# Patient Record
Sex: FEMALE | Race: WHITE
[De-identification: names, ages, dates, MRNs, and addresses within clinical notes are randomized per-mention and may not be internally consistent; named-entity substitution may affect disease eponyms.]

---

## 2017-03-29 ENCOUNTER — HOSPITAL ENCOUNTER (EMERGENCY)
Dept: HOSPITAL 74 - JER | Age: 52
Discharge: HOME | End: 2017-03-29
Payer: COMMERCIAL

## 2017-03-29 VITALS — HEART RATE: 58 BPM | DIASTOLIC BLOOD PRESSURE: 72 MMHG | SYSTOLIC BLOOD PRESSURE: 110 MMHG

## 2017-03-29 VITALS — TEMPERATURE: 98 F

## 2017-03-29 VITALS — BODY MASS INDEX: 24.9 KG/M2

## 2017-03-29 DIAGNOSIS — I10: ICD-10-CM

## 2017-03-29 DIAGNOSIS — M79.661: Primary | ICD-10-CM

## 2017-03-29 NOTE — PDOC
[id number]


No Limitations





- History of Present Illness


Initial Comments: 


03/29/17 18:26


The patient is a 51-year-old woman with a significant past medical history of 

hypertension who was sent to the emergency department by her PMD to rule out 

possible tight lower extremity DVT. As per patient, she reports that a sharp 

right leg pain woke her up from her sleep this morning. She took an Aspirin 

that provided minimal relief. She went on to work and maintained her leg 

elevated. While at work, she noticed that her veins were highly noticeable. She 

informed her PMD, who advised her to present to the ED. No recent 

immobilizations. She currently states that she feels as if her right thigh is 

sore. No other complaints. 





No fever, chills, chest pain, lightheadedness, leg swelling, shortness of 

breath. No nausea, vomiting, diarrhea.





Allergies: Penicillins


Social History: No tobacco and recreational drug use. Social ETOH use.





<Bre Guerrero - Last Filed: 03/29/17 18:43>





<Sarah Harris - Last Filed: 03/29/17 23:14>





- General


Chief Complaint: Pain


Stated Complaint: PCP SENT/RT LEG PAIN


Time Seen by Provider: 03/29/17 18:03





Past History





<Bre Guerrero - Last Filed: 03/29/17 18:43>





- Past Medical History


Other medical history: NONE





- Psycho/Social/Smoking Cessation Hx


Suicidal Ideation: No


Smoking History: Never smoked


Hx Alcohol Use: Yes (SOCIAL)


Drug/Substance Use Hx: No


Substance Use Type: None





<Sarah Harris - Last Filed: 03/29/17 23:14>





- Past Medical History


Allergies/Adverse Reactions: 


 Allergies











Allergy/AdvReac Type Severity Reaction Status Date / Time


 


Penicillins Allergy   Unverified 03/29/17 16:59














**Review of Systems





- Review of Systems


Able to Perform ROS?: Yes


Comments:: 


03/29/17 18:26


GENERAL/CONSTITUTIONAL: No fever or chills. No weakness.


HEAD, EYES, EARS, NOSE AND THROAT: No change in vision. No ear pain or 

discharge. No sore throat.


CARDIOVASCULAR: No chest pain or shortness of breath.


RESPIRATORY: No cough, wheezing, or hemoptysis.


GASTROINTESTINAL: No nausea, vomiting, diarrhea or constipation.


GENITOURINARY: No dysuria, frequency, or change in urination.


MUSCULOSKELETAL: Yes: Right leg pain. No neck or back pain.


SKIN: No rash


NEUROLOGIC: No headache, vertigo, loss of consciousness, or change in strength/

sensation.


ENDOCRINE: No increased thirst. No abnormal weight change.


HEMATOLOGIC/LYMPHATIC: No anemia, easy bleeding, or history of blood clots.


ALLERGIC/IMMUNOLOGIC: No hives or skin allergy.








<CesarBre - Last Filed: 03/29/17 18:43>





*Physical Exam





- Vital Signs


 Last Vital Signs











Temp Pulse Resp BP Pulse Ox


 


 98.0 F   53 L  20   108/66   97 


 


 03/29/17 16:56  03/29/17 16:56  03/29/17 16:56  03/29/17 16:56  03/29/17 16:56














- Physical Exam


Comments: 


03/29/17 18:26


GENERAL: Awake, alert, and fully oriented, in no acute distress 


HEAD: No signs of trauma


EYES: PERRLA, EOMI, sclera anicteric, conjunctiva clear


ENT: Auricles normal inspection, hearing grossly normal, nares patent, 

oropharynx clear without exudates. Moist mucosa


NECK: Normal ROM, supple, no lymphadenopathy, JVD, or masses


LUNGS: Breath sounds equal, clear to auscultation bilaterally.  No wheezes, and 

no crackles


HEART: Regular rate and rhythm, normal S1 and S2, no murmurs, rubs or gallops


ABDOMEN: Soft, nontender, normoactive bowel sounds.  No guarding, no rebound.  

No masses


EXTREMITIES: Normal range of motion, no edema. The right calf is slightly 

larger than the left calf and is nontender. No clubbing or cyanosis. No cords, 

erythema, or tenderness


NEUROLOGICAL: Cranial nerves II through XII grossly intact.  Normal speech








<GuerreroBre - Last Filed: 03/29/17 18:43>





- Vital Signs


 Last Vital Signs











Temp Pulse Resp BP Pulse Ox


 


 98.0 F   53 L  20   108/66   97 


 


 03/29/17 16:56  03/29/17 16:56  03/29/17 16:56  03/29/17 16:56  03/29/17 16:56














<Sarah Harris - Last Filed: 03/29/17 23:14>





Medical Decision Making





- Medical Decision Making





03/29/17 19:03


Patient discussed with Dr. Irwin at shift change. Awaiting doppler sono to 

r/o DVT. If neg, patient is stable for DC home. 








<Sarah Harris - Last Filed: 03/29/17 23:14>





*DC/Admit/Observation/Transfer





- Attestations


Scribe Attestion: 


03/29/17 18:26


Documentation prepared by Bre Guerrero, acting as medical scribe for 

Sarah Harris MD.





<Bre Guerrero - Last Filed: 03/29/17 18:43>





<Sarah Harris - Last Filed: 03/29/17 23:14>


Diagnosis at time of Disposition: 


 Right calf pain





- Referrals


Referrals: 


Chuck Gallardo MD [Primary Care Provider] - 





- Patient Instructions


Printed Discharge Instructions:  DI for Leg Pain


Additional Instructions: 


Please follow up with your doctor as soon as possible

## 2017-03-29 NOTE — PDOC
*Physical Exam





- Vital Signs


 Last Vital Signs











Temp Pulse Resp BP Pulse Ox


 


 98.0 F   53 L  20   108/66   97 


 


 03/29/17 16:56  03/29/17 16:56  03/29/17 16:56  03/29/17 16:56  03/29/17 16:56














*DC/Admit/Observation/Transfer


Diagnosis at time of Disposition: 


 Right calf pain





- Discharge Dispostion


Admit: No





- Referrals


Referrals: 


Chuck Gallardo MD [Primary Care Provider] - 





- Patient Instructions


Printed Discharge Instructions:  DI for Leg Pain


Additional Instructions: 


Please follow up with your doctor as soon as possible





- Post Discharge Activity

## 2020-09-03 ENCOUNTER — EMERGENCY (EMERGENCY)
Facility: HOSPITAL | Age: 55
LOS: 1 days | Discharge: HOME | End: 2020-09-03
Attending: EMERGENCY MEDICINE | Admitting: EMERGENCY MEDICINE
Payer: COMMERCIAL

## 2020-09-03 VITALS
HEIGHT: 64 IN | RESPIRATION RATE: 18 BRPM | HEART RATE: 76 BPM | SYSTOLIC BLOOD PRESSURE: 115 MMHG | WEIGHT: 139.99 LBS | OXYGEN SATURATION: 96 % | DIASTOLIC BLOOD PRESSURE: 75 MMHG | TEMPERATURE: 98 F

## 2020-09-03 VITALS
HEART RATE: 58 BPM | TEMPERATURE: 98 F | RESPIRATION RATE: 18 BRPM | SYSTOLIC BLOOD PRESSURE: 108 MMHG | OXYGEN SATURATION: 97 % | DIASTOLIC BLOOD PRESSURE: 71 MMHG

## 2020-09-03 LAB
ALBUMIN SERPL ELPH-MCNC: 4.6 G/DL — SIGNIFICANT CHANGE UP (ref 3.3–5)
ALP SERPL-CCNC: 67 U/L — SIGNIFICANT CHANGE UP (ref 40–120)
ALT FLD-CCNC: 11 U/L — SIGNIFICANT CHANGE UP (ref 10–45)
ANION GAP SERPL CALC-SCNC: 11 MMOL/L — SIGNIFICANT CHANGE UP (ref 5–17)
AST SERPL-CCNC: 13 U/L — SIGNIFICANT CHANGE UP (ref 10–40)
BASOPHILS # BLD AUTO: 0.05 K/UL — SIGNIFICANT CHANGE UP (ref 0–0.2)
BASOPHILS NFR BLD AUTO: 0.7 % — SIGNIFICANT CHANGE UP (ref 0–2)
BILIRUB SERPL-MCNC: 0.8 MG/DL — SIGNIFICANT CHANGE UP (ref 0.2–1.2)
BUN SERPL-MCNC: 14 MG/DL — SIGNIFICANT CHANGE UP (ref 7–23)
CALCIUM SERPL-MCNC: 9.6 MG/DL — SIGNIFICANT CHANGE UP (ref 8.4–10.5)
CHLORIDE SERPL-SCNC: 104 MMOL/L — SIGNIFICANT CHANGE UP (ref 96–108)
CK MB CFR SERPL CALC: <1 NG/ML — SIGNIFICANT CHANGE UP (ref 0–6.7)
CK SERPL-CCNC: 48 U/L — SIGNIFICANT CHANGE UP (ref 25–170)
CO2 SERPL-SCNC: 25 MMOL/L — SIGNIFICANT CHANGE UP (ref 22–31)
CREAT SERPL-MCNC: 0.71 MG/DL — SIGNIFICANT CHANGE UP (ref 0.5–1.3)
EOSINOPHIL # BLD AUTO: 0.12 K/UL — SIGNIFICANT CHANGE UP (ref 0–0.5)
EOSINOPHIL NFR BLD AUTO: 1.7 % — SIGNIFICANT CHANGE UP (ref 0–6)
GLUCOSE SERPL-MCNC: 100 MG/DL — HIGH (ref 70–99)
HCT VFR BLD CALC: 38.8 % — SIGNIFICANT CHANGE UP (ref 34.5–45)
HGB BLD-MCNC: 12.8 G/DL — SIGNIFICANT CHANGE UP (ref 11.5–15.5)
IMM GRANULOCYTES NFR BLD AUTO: 0.3 % — SIGNIFICANT CHANGE UP (ref 0–1.5)
LIDOCAIN IGE QN: 33 U/L — SIGNIFICANT CHANGE UP (ref 7–60)
LYMPHOCYTES # BLD AUTO: 2.96 K/UL — SIGNIFICANT CHANGE UP (ref 1–3.3)
LYMPHOCYTES # BLD AUTO: 41.4 % — SIGNIFICANT CHANGE UP (ref 13–44)
MAGNESIUM SERPL-MCNC: 2 MG/DL — SIGNIFICANT CHANGE UP (ref 1.6–2.6)
MCHC RBC-ENTMCNC: 28.3 PG — SIGNIFICANT CHANGE UP (ref 27–34)
MCHC RBC-ENTMCNC: 33 GM/DL — SIGNIFICANT CHANGE UP (ref 32–36)
MCV RBC AUTO: 85.8 FL — SIGNIFICANT CHANGE UP (ref 80–100)
MONOCYTES # BLD AUTO: 0.53 K/UL — SIGNIFICANT CHANGE UP (ref 0–0.9)
MONOCYTES NFR BLD AUTO: 7.4 % — SIGNIFICANT CHANGE UP (ref 2–14)
NEUTROPHILS # BLD AUTO: 3.47 K/UL — SIGNIFICANT CHANGE UP (ref 1.8–7.4)
NEUTROPHILS NFR BLD AUTO: 48.5 % — SIGNIFICANT CHANGE UP (ref 43–77)
NRBC # BLD: 0 /100 WBCS — SIGNIFICANT CHANGE UP (ref 0–0)
NT-PROBNP SERPL-SCNC: 38 PG/ML — SIGNIFICANT CHANGE UP (ref 0–300)
PLATELET # BLD AUTO: 257 K/UL — SIGNIFICANT CHANGE UP (ref 150–400)
POTASSIUM SERPL-MCNC: 3.5 MMOL/L — SIGNIFICANT CHANGE UP (ref 3.5–5.3)
POTASSIUM SERPL-SCNC: 3.5 MMOL/L — SIGNIFICANT CHANGE UP (ref 3.5–5.3)
PROT SERPL-MCNC: 7.1 G/DL — SIGNIFICANT CHANGE UP (ref 6–8.3)
RBC # BLD: 4.52 M/UL — SIGNIFICANT CHANGE UP (ref 3.8–5.2)
RBC # FLD: 13.1 % — SIGNIFICANT CHANGE UP (ref 10.3–14.5)
SODIUM SERPL-SCNC: 140 MMOL/L — SIGNIFICANT CHANGE UP (ref 135–145)
TROPONIN T SERPL-MCNC: <0.01 NG/ML — SIGNIFICANT CHANGE UP (ref 0–0.01)
WBC # BLD: 7.15 K/UL — SIGNIFICANT CHANGE UP (ref 3.8–10.5)
WBC # FLD AUTO: 7.15 K/UL — SIGNIFICANT CHANGE UP (ref 3.8–10.5)

## 2020-09-03 PROCEDURE — 83735 ASSAY OF MAGNESIUM: CPT

## 2020-09-03 PROCEDURE — 99284 EMERGENCY DEPT VISIT MOD MDM: CPT | Mod: 25

## 2020-09-03 PROCEDURE — 96374 THER/PROPH/DIAG INJ IV PUSH: CPT | Mod: XU

## 2020-09-03 PROCEDURE — 36415 COLL VENOUS BLD VENIPUNCTURE: CPT

## 2020-09-03 PROCEDURE — 71275 CT ANGIOGRAPHY CHEST: CPT

## 2020-09-03 PROCEDURE — 82550 ASSAY OF CK (CPK): CPT

## 2020-09-03 PROCEDURE — 82553 CREATINE MB FRACTION: CPT

## 2020-09-03 PROCEDURE — 83880 ASSAY OF NATRIURETIC PEPTIDE: CPT

## 2020-09-03 PROCEDURE — 85025 COMPLETE CBC W/AUTO DIFF WBC: CPT

## 2020-09-03 PROCEDURE — 83690 ASSAY OF LIPASE: CPT

## 2020-09-03 PROCEDURE — 93005 ELECTROCARDIOGRAM TRACING: CPT

## 2020-09-03 PROCEDURE — 99285 EMERGENCY DEPT VISIT HI MDM: CPT

## 2020-09-03 PROCEDURE — 84484 ASSAY OF TROPONIN QUANT: CPT

## 2020-09-03 PROCEDURE — 71275 CT ANGIOGRAPHY CHEST: CPT | Mod: 26

## 2020-09-03 PROCEDURE — 93010 ELECTROCARDIOGRAM REPORT: CPT

## 2020-09-03 PROCEDURE — 80053 COMPREHEN METABOLIC PANEL: CPT

## 2020-09-03 RX ORDER — KETOROLAC TROMETHAMINE 30 MG/ML
30 SYRINGE (ML) INJECTION ONCE
Refills: 0 | Status: DISCONTINUED | OUTPATIENT
Start: 2020-09-03 | End: 2020-09-03

## 2020-09-03 RX ADMIN — Medication 30 MILLIGRAM(S): at 22:50

## 2020-09-03 NOTE — ED PROVIDER NOTE - OBJECTIVE STATEMENT
Pt is a 55F who presents to ED for chest pain. pt states she has been having pain intermittently for the past 2 weeks. Pt states she was seen at urgent care on monday, diagnosed with LLL pneumonia, given Zithromax for abx and instructed to return for persistent or worsening symptoms. Pt states she is having persistent left lateral chest wall pain associated with difficulty breathing intermittently. No fever. Pt well appearing in ED, speaking in full sentences. Vitals stable.

## 2020-09-03 NOTE — ED ADULT NURSE REASSESSMENT NOTE - NS ED NURSE REASSESS COMMENT FT1
Pt dcd to home with written/verbal instructions. Ambulatory with no s/s distress noted at time of departure.

## 2020-09-03 NOTE — ED ADULT TRIAGE NOTE - CHIEF COMPLAINT QUOTE
L side chest pain x2-3 weeks. denies fever/cough. referred from urgent care s/p possible LLL pneumonia seen on x-ray.

## 2020-09-03 NOTE — ED ADULT NURSE NOTE - NSIMPLEMENTINTERV_GEN_ALL_ED
Implemented All Universal Safety Interventions:  [place] to call system. Call bell, personal items and telephone within reach. Instruct patient to call for assistance. Room bathroom lighting operational. Non-slip footwear when patient is off stretcher. Physically safe environment: no spills, clutter or unnecessary equipment. Stretcher in lowest position, wheels locked, appropriate side rails in place.

## 2020-09-03 NOTE — ED ADULT NURSE NOTE - OBJECTIVE STATEMENT
pt c/o of left chest pain, pt c/o of left chest pain, lateral side on and off x 2 weeks. Pt went to [facility] and sent here for further eval.

## 2020-09-03 NOTE — ED PROVIDER NOTE - PATIENT PORTAL LINK FT
You can access the FollowMyHealth Patient Portal offered by [facility] by registering at the following website: [email or web address]. By joining [facility]’s FollowMyHealth portal, you will also be able to view your health information using other applications (apps) compatible with our system.

## 2020-09-07 DIAGNOSIS — R07.89 OTHER CHEST PAIN: ICD-10-CM

## 2023-10-02 NOTE — ED ADULT TRIAGE NOTE - DOMESTIC TRAVEL HIGH RISK QUESTION
Anesthesia Pre Eval Note    Anesthesia ROS/Med Hx    Overall Review:  EKG was reviewed     Anesthetic Complication History:  Patient does not have a history of anesthetic complications      Pulmonary Review:  Patient does not have a pulmonary history      Neuro/Psych Review:  Patient does not have a neuro/psych history       Cardiovascular Review:  Patient does not have a cardiovascular history       GI/HEPATIC/RENAL Review:  Patient does not have a GI/hepatic/renalhistory       End/Other Review:    Positive for anemia  Additional Results:     ALLERGIES:   -- Fish Allergy   (Food Or Med) -- Other (See Comments)   -- Nuts   (Food) -- Other (See Comments)   -- Penicillins -- Other (See Comments)    --  Unknown reaction   Last Labs        Component                Value               Date/Time                  WBC                      6.7                 10/02/2023 0256            RBC                      2.23 (L)            10/02/2023 0256            HGB                      6.8 (LL)            10/02/2023 0256            HCT                      20.6 (L)            10/02/2023 0256            MCV                      92.4                10/02/2023 0256            MCH                      30.5                10/02/2023 0256            MCHC                     33.0                10/02/2023 0256            RDW-CV                   13.6                10/02/2023 0256            Sodium                   141                 10/02/2023 0256            Potassium                3.4                 10/02/2023 0256            Chloride                 108                 10/02/2023 0256            Carbon Dioxide           28                  10/02/2023 0256            Glucose                  124 (H)             10/02/2023 0256            BUN                      17                  10/02/2023 0256            Creatinine               0.68                10/02/2023 0256            Glomerular Filtrati*     >90                  10/02/2023 0256            Calcium                  8.6                 10/02/2023 0256            PLT                      229                 10/02/2023 0256            PTT                      28                  10/01/2023 1441            INR                      1.0                 10/01/2023 1441        Past Medical History:  No date: Hernia, inguinal  History reviewed. No pertinent surgical history.   Prior to Admission medications :  Medication cyclobenzaprine (FLEXERIL) 10 MG tablet, Sig Take 1 tablet by mouth 2 times daily as needed., Start Date 8/1/23, End Date , Taking? Yes, Authorizing Provider Provider, Outside    Medication Multiple Vitamins-Minerals (vitamin - therapeutic multivitamins w/minerals) tablet, Sig Take 1 tablet by mouth daily., Start Date , End Date , Taking? Yes, Authorizing Provider Provider, Outside    Medication ibuprofen (MOTRIN) 200 MG tablet, Sig Take 600 mg by mouth 2 times daily as needed for Pain., Start Date , End Date , Taking? Yes, Authorizing Provider Provider, Outside    Medication loratadine (CLARITIN) 10 MG tablet, Sig Take 1 tablet by mouth daily., Start Date , End Date , Taking? Yes, Authorizing Provider Provider, Outside         Patient Vitals in the past 24 hrs:  10/02/23 0646, BP:(!) 169/95, Temp:37.4 °C (99.3 °F), Pulse:93, Resp:(!) 25  10/02/23 0530, BP:(!) 152/75, Pulse:74, Resp:19, SpO2:100 %  10/02/23 0430, BP:(!) 140/87, Pulse:85, Resp:17, SpO2:100 %  10/02/23 0400, BP:(!) 153/73, Temp:37 °C (98.6 °F), Temp src:Axillary, Pulse:73, Resp:18, SpO2:100 %  10/02/23 0349, BP:133/72, Temp:37.3 °C (99.1 °F), Temp src:Axillary, Pulse:76, Resp:19, SpO2:100 %  10/02/23 0334, BP:130/78, Temp:37.4 °C (99.3 °F), Temp src:Axillary, Pulse:83, Resp:18  10/02/23 0300, BP:(!) 155/84, Pulse:84, Resp:(!) 20, SpO2:100 %  10/02/23 0200, BP:133/67, Pulse:93, Resp:(!) 20, SpO2:100 %  10/02/23 0100, BP:134/68, Pulse:93, Resp:(!) 12, SpO2:100 %  10/02/23 0000, BP:(!) 120/94, Temp:37.2 °C  (99 °F), Temp src:Oral, Pulse:(!) 102, Resp:18, SpO2:100 %  10/01/23 2300, BP:134/73, Pulse:90, Resp:(!) 20, SpO2:100 %  10/01/23 2200, BP:121/72, Pulse:(!) 103, Resp:(!) 20, SpO2:100 %  10/01/23 2100, Pulse:95, Resp:(!) 28, SpO2:100 %  10/01/23 2000, BP:113/77, Temp:37.5 °C (99.5 °F), Temp src:Oral, Pulse:97, Resp:(!) 24, SpO2:100 %  10/01/23 1900, BP:134/77, Pulse:(!) 105, Resp:(!) 10, SpO2:100 %  10/01/23 1800, BP:133/78, Pulse:(!) 100, Resp:(!) 21, SpO2:100 %  10/01/23 1700, BP:(!) 147/74, Pulse:(!) 103, Resp:19, SpO2:100 %  10/01/23 1600, BP:(!) 147/92, Temp:37.5 °C (99.5 °F), Temp src:Axillary, Pulse:(!) 103, Resp:(!) 22, SpO2:100 %  10/01/23 1500, BP:(!) 147/103, Pulse:(!) 110, Resp:(!) 22, SpO2:100 %  10/01/23 1400, BP:(!) 145/79, Pulse:(!) 110, Resp:(!) 23, SpO2:100 %  10/01/23 1300, BP:(!) 135/91, Pulse:(!) 109, Resp:17, SpO2:100 %  10/01/23 1200, BP:130/80, Temp:37.5 °C (99.5 °F), Temp src:Axillary, Pulse:(!) 106, Resp:(!) 28, SpO2:100 %  10/01/23 1100, BP:(!) 140/97, Pulse:(!) 109, Resp:15, SpO2:100 %  10/01/23 1000, BP:(!) 143/74, Pulse:(!) 112, Resp:(!) 24, SpO2:100 %  10/01/23 0900, BP:(!) 152/83, Pulse:(!) 106, Resp:(!) 34, SpO2:100 %  10/01/23 0800, BP:137/77, Temp:37.7 °C (99.9 °F), Temp src:Oral, Pulse:(!) 107, Resp:(!) 25, SpO2:100 %  Social history reviewed:  Social History    Tobacco Use      Smoking status: Never      Smokeless tobacco: Never   E-Cigarette/Vaping Substances & Devices  E-Cigarette/Vaping Use: Never Used  Nicotine: No  THC: No  CBD: No  Flavoring: No  Disposable: No  Pre-filled or Refillable Cartridge: No  Refillable Tank: No  Pre-filled Pod: No    Social History    Substance and Sexual Activity      Alcohol use: Never        Relevant Problems   No relevant active problems       Physical Exam     Airway   Mallampati: II  TM Distance: >3 FB  Neck ROM: Full  Neck: C-collar  TMJ Mobility: Good    Cardiovascular  Cardiovascular exam normal    Head Assessment  Head assessment:  Normocephalic    General Assessment    General Assessment Note: Mid disorientation, intermittent. Phone consent from Son.      Dental Exam    Patient has:  Denied broken/chipped/loose teeth    Pulmonary Exam  Pulmonary exam normal      Anesthesia Plan:  No phone call attempted due to:  ASA Status: 3  Anesthesia Type: General    Induction: Intravenous  Preferred Airway Type: ETTPatient does not have a difficult airway or is not at risk of aspiration.   Maintenance: Inhalational  Premedication: None      Post-op Pain Management: Per Surgeon      Checklist  Reviewed: Lab Results, EKG, NPO Status, Past Med History, Allergies, Medications and Problem list  Consent/Risks Discussed Statement:  The proposed anesthetic plan, including its risks and benefits, have been discussed with the Patient and Son along with the risks and benefits of alternatives. Questions were encouraged and answered and the patient and/or representative understands and agrees to proceed.    I have discussed elements of the patient's history or examination, as noted above and/or as follows, that place the patient at higher risk of complications; BMI> 30 (obesity).    I discussed with the patient (and/or patient's legal representative) the risks and benefits of the proposed anesthesia plan, General, which may include services performed by other anesthesia providers.    Alternative anesthesia plans, if available, were reviewed with the patient (and/or patient's legal representative). Discussion has been held with the patient (and/or patient's legal representative) regarding risks of anesthesia, which include allergic reaction, anxiety, bleeding/hematoma, dental injury, nausea, vomiting and need for blood transfusion and emergent situations that may require change in anesthesia plan.    The patient (and/or patient's legal representative) has indicated understanding, his/her questions have been answered, and he/she wishes to proceed with the planned  anesthetic.      Informed Consent for Blood: Consented  Blood Products: Not Anticipated     No